# Patient Record
Sex: FEMALE | Race: WHITE
[De-identification: names, ages, dates, MRNs, and addresses within clinical notes are randomized per-mention and may not be internally consistent; named-entity substitution may affect disease eponyms.]

---

## 2019-06-05 ENCOUNTER — HOSPITAL ENCOUNTER (OUTPATIENT)
Dept: HOSPITAL 76 - PC | Age: 79
Discharge: HOME | End: 2019-06-05
Attending: NURSE PRACTITIONER
Payer: MEDICARE

## 2019-06-05 DIAGNOSIS — R63.0: ICD-10-CM

## 2019-06-05 DIAGNOSIS — R53.83: ICD-10-CM

## 2019-06-05 DIAGNOSIS — Z82.0: ICD-10-CM

## 2019-06-05 DIAGNOSIS — Z66: ICD-10-CM

## 2019-06-05 DIAGNOSIS — Z91.81: ICD-10-CM

## 2019-06-05 DIAGNOSIS — H54.7: ICD-10-CM

## 2019-06-05 DIAGNOSIS — Z85.819: ICD-10-CM

## 2019-06-05 DIAGNOSIS — Z91.83: ICD-10-CM

## 2019-06-05 DIAGNOSIS — Z86.73: ICD-10-CM

## 2019-06-05 DIAGNOSIS — F41.9: ICD-10-CM

## 2019-06-05 DIAGNOSIS — R32: ICD-10-CM

## 2019-06-05 DIAGNOSIS — G30.9: ICD-10-CM

## 2019-06-05 DIAGNOSIS — L40.9: ICD-10-CM

## 2019-06-05 DIAGNOSIS — R64: ICD-10-CM

## 2019-06-05 DIAGNOSIS — R53.1: ICD-10-CM

## 2019-06-05 DIAGNOSIS — F02.81: ICD-10-CM

## 2019-06-05 DIAGNOSIS — Z51.5: Primary | ICD-10-CM

## 2019-06-05 DIAGNOSIS — R25.1: ICD-10-CM

## 2019-06-05 DIAGNOSIS — K59.09: ICD-10-CM

## 2019-06-05 DIAGNOSIS — Z85.3: ICD-10-CM

## 2019-06-05 PROCEDURE — 99344 HOME/RES VST NEW MOD MDM 60: CPT

## 2019-06-05 NOTE — CONSULTATION NOTE
Palliative Care Consultation





- Referral


Referring Provider: Swathi CONNER


Time of Visit: 6879-1177


Referral setting: Home


Referral Reason: Alzheimer's Dementia/Cachexia/Goals of Care





- Information Sources


Records reviewed: Previous records reviewed (few available)


History/Review of Systems obtained from: Family (daughter Flora and  

Candido at visit)


Exam limitations: Clinical condition (patient nonverbal)





- History of Present Illness


Brief History of Present Illness: 


Is a 79-year-old woman with advanced Alzheimer's dementia, presenting as a 

FAST7B. Her  Candido, who is a  20 years, has very poor recall and 

short-term memories himself as far as being able to recall exactly patient's 

history, including cancer history, or journey with dementia.  Daughter has had 

her for 5 years, reports she had forgetfulness previous to this, and had 

deteriorated fairly rapidly with the treatment of her throat cancer she recalls.

 She has been nonverbal, but ambulatory.  She has had wandering in the past, she

has not been violent, is unclear if she has had delusions or hallucinations, 

though does  things and  things from the floor frequently.  She 

has been on donepazil and memanda in past, stopped in fall when "ran out" and no

provider. Her current behaviors include wandering frequently through the house, 

frequent falls, she has had no recent UTIs or hospitalizations.  Her most 

pronounced symptom has been weight loss.  This is despite daughter's perception,

patient is eating fairly adequate calories, unclear if its patient's activity 

level or patient's intake that is adding to the decline.  Patient without teeth,

does have a soft diet, she does get ice cream floats, she drinks an Ensure 

daily.  She does need some encouragement, she does chew continously so eating 

takes most of the day. Daughter reports most likely is not getting adequate 

fluids.





Daughter is quite anxious regarding she feels like she is in her last phase.  

Both  and daughter are quite clear focuses to be comfort.  Do not want to

go to the hospital.  And she has been a DNR in the past.  Daughter presents with

caregiver fatigue, she is looking forward to support from her Sister Lexii who 

is coming with her other sister to live with her and help provide ongoing 

caregiving.





Patient dislikes being touched, did manage to do an exam, but unable to examine 

closely for lymphadenopathy, though no swelling, or signs of symptoms of pain.  

She would not allow a breast exam, but could not see any scarring or evidence of

lumpectomy.  Her old records only said history of and was not expanded on.  

Flora reports her other sister was more involved than that, she will be 

available at future visit in early July. She is somewhat hypotensive, with 

elevated heart rate, but is quite anxious having me examine her, but did relax 

through end of visit.








Medical/Surgical History





- Past Medical History


Neuro: Alzhiemer's, Dementia, CVA, Tremors


GI: reports: Chronic constipation


: reports: Incontinence


HEENT: reports: Chronic vision loss


Psych: reports: Anxiety


Musculoskeletal: reports: Fatigue


Derm: reports: Psoriasis


Other Past Medical History: patient with documented Breast cancer old record 

unknown treatment ?; documented "head and neck" cancer; daughter thought 

throat cancer ?;  does not remember; thinks may have received 

radiation at some point





Social History





- Living Situation


Living arrangement: At home


Living Situation: With spouse/s.o. (Patient lived in [place], she was a social 

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  She  weighing, they were  for 20 years.  As

she got worse, she came to live with her daughter Flora in [place].  She has been 

living with her for 5 years, at the time she arrived she was nonverbal.  She 

also had wandering behaviors.), With family (She lives with Flora, who is a 

primary caregiver, Candido does provide some support.  She did have a paid 

caregiver in [place], she has been living here since September with no other 

assistance.  She is hoping to get a [place]  Her

sister is coming the beginning of July, she will assist with caregiving.)





Family History





- Family History


Family History: Mother: , Alzheimer's Disease, Father: , 

CVA/TIA, Sister: , Alzheimer's Disease, Other family: Alive and Well 

(has three daughter)





Medications/Allergies





- Medications


Home Medications: 


                                Ambulatory Orders











 Medication  Instructions  Recorded  Confirmed


 


Multivitamin [Multivitamins] 1 tab PO DAILY 19














- Allergies


Allergies/Adverse Reactions: 


                                    Allergies











Allergy/AdvReac Type Severity Reaction Status Date / Time


 


No Known Drug Allergies Allergy   Verified 19 16:24














Review of Systems





- Constitutional


Constitutional: reports: Fatigue, Poor appetite, Weight loss (documented weight 

5/17 record 115; 83.4 on 5/3 at MD appt)





- Eyes


Eyes: reports: Vision loss (daughter feels grabs at things and misses; unclear 

if can't see them clearly or just her dementia)





- Ears, Nose & Throat


Ears, Nose & Throat: reports: Other (no teeth; lost dentures)





- Respiratory


Respiratory: reports: Other (no respiratory distress observed by family)





- Gastrointestinal


Gastrointestinal: reports: Constipation (bm today), Other (patient appears to 

eat; watched eat lunch;Has not had any choking, tends to just chew continuously.

  Did not refuse food.  She has needed assistance with feeding for the last few 

months.  This is a new decline.)





- Genitourinary


Genitourinary: reports: Incontinence





- Musculoskeletal


Musculoskeletal: reports: Stiffness, Other (Had several falls, but no injury.  

Sometimes it does find her on the floor, unclear if it is balance, impulsivity, 

or weakness as an etiology at this time.)





- Integumentary


Integumentary: reports: Dryness





- Neurological


Neurological: reports: General weakness, Memory problems (FAST7C; No, is able to

 communicate if she does not want something by pushing away.)





- Psychiatric


Psychiatric: reports: Anxiety, Behavior disturbances





- Endocrine


Endocrine: reports: Intolerance to cold (Patient lived for long period of time 

in [place], daughter does feel like patient is cold most of the time.)





- Hematologic/Lymphatic


Hematologic/Lymphatic: denies: Recurrent infections





- All Other Systems


All Other Systems: reports: Other (limited ROS)





Physical Exam





- Vital Signs


Temperature: 96.5 C


Respiratory Rate: 96


Blood Pressure: 92/42





- Physical Exam


General Appearance: positive: Anxious


Eyes Bilateral: positive: Other (right lower eye lid /droopy)


ENT: positive: Other (moist mucous membranes)


Neck: positive: Trachea midline.  negative: Lymphadenopathy (R), Lymphadenopathy

 (L)


Cardiovascular: positive: Tachycardia


Respiratory: positive: Diminished in bases.  negative: Wheezes, Rales, Rhonchi


Abdomen: positive: Non-tender, Soft


Skin: positive: Pallor, Dryness


Extremities: positive: No pedal edema, Other (Patient able to get from sitting 

to standing, she did wander continuously from room to room.  Gait is shuffled, 

steady, does not appear to have balance issues.  Though does have stooped 

posture.  She does have some upper extremity wasting, legs thin but not 

wasting.)


Neurologic/Psychiatric: positive: Mood/affect nml, Disoriented to person, 

Disoriented to place, Disoriented to time, Weakness





Palliative Care





- POLST


Patient has POLST: Yes


POLST Status: DNR, Comfort Measures (completed with  and daughter time of

 visit)


Pain: No pain, Comment (Family report no history of pain, the patient does not 

present with pain behaviors.  On examination did not seem painful or 

distressed.)


Drowsiness/Sedation: Comment (And is sleeping more, takes frequent naps, does 

sleep through the night.  Even though patient quite active when she is up and 

moving, she is sleeping for greater percentage of the day.)


Sleep: Sleeps well


Constipation: Yes, Unmanaged


Feelings of wellbeing/Perceived Quality of Life: Poor, Worsening (daughter's 

perception of patient's quality of life is declining)


Performance Status: 


Patient is ambulatory, and actually has fairly frenetic movement through the 

day.  She is nonverbal, though is able to make eye contact.  Family does believe

 she recognizes who they are.  She does try engage in some social interaction 

while we were sitting there.  They are able to bathe her without any difficulty,

 she has been incontinent of her several years.  What is changed most recently 

as patient is unable to feed herself, and appears to forgotten how to eat and 

swallow does chew continuously.  Would put her at F AST 7B.








- Palliative Care


Discussion: 


Discussion regarding patient's current quality of life, reports patient has 

continued to deteriorate.  Has been present, did not add much to the 

conversation, unclear if he has memory problems as well but is able to 

participate in the conversation.  Both agree patient has been declining fairly 

rapidly, most likely presented and weight loss.  They do perceive she is 

sleeping more.  As far as goals of care, they do want her to be a do not attempt

 resuscitation, she has been in the past.  Her  is her D POA, we reviewed

 the POLST, they do not want hospitalization or further work-up.  It would be 

difficult for patient to participate in any kind of testing including lab 

testing.  After much discussion decision was made for DNA R/comfort measures.  

Patient does not meet hospice criteria at this point in time, though she has had

 weight loss, she has had no infections, no hospitalizations, no acute sequela 

of falls.  Agreed would continue to monitor for the next few weeks, her weight 

loss may be related to other processes not just her dementia, though daughter 

does feel like her time is "coming".  The daughter does exhibit severe anxiety 

regarding this, is very relieved her sister is coming to provide some care and 

support with recent changes and decline. 








Impression and Recommendations





- Palliative Care


Impression: 


This is a 79-year-old woman who has had progressive Alzheimer's presents as a F 

AST 7B, with recent loss of ability to feed herself.  She is cachetic acute 

weight loss of 15 to 20 pounds over the last couple months and 40 pounds total 

over the last couple years. Goals of family are to focus on comfort, avoid 

hospitalization, and transition to hospice when meets criteria.  Palliative care

 to continue to provide support, education regarding disease and trajectory, and

 anticipatory guidance until transition.





Recommendations/Counseling Done: 


1. Dementia with behavioral disturbances.  Patient does present as FASTB. At 

this point in time they are using distraction, redirection, manage patient's 

frequent pacing, intermittent anxiety, and not perceive acute agitation or need 

for intervention.  We will continue to monitor, patient may benefit from low-

dose Seroquel 12.5 mg, but will continue to monitor given patient's ongoing fall

 risk.





2.  Cachexia.  Patient does present with decreased intake, more related to her 

dementia, with slow eating, does not self initiate feeding anymore.  Challenging

 to keep patient hydrated.  Discussed at length current strategies using, ways 

to increase fluid with puddings, pured foods, she does like ice cream.  

Counseling provided given their current goals, eating for pleasure.  Unclear if 

there may be a secondary malignancy process going on, adding to her weight loss.

  Counseling provided regarding weighing benefits and burdens, given goals to 

focus on comfort will not initiate further work-up for definitive diagnosis.





3.  Caregiver burnout.  Patient is cared for by her , but daughter wanted

 his primary caregiver.  Explored options for respite, her sister is coming 

actually in July to be part of the caregiving team, she then hopes to find a 

job.  Recommended Alzheimer's support group and caregiving group.  Call to Whit 

at [facility] with referral, to follow-up with Flora.  Patient may qualify 

for TSS program.





4.  Advanced care planning.  Patient's  Candido is LADY LARSON, supported by 

patient's daughter Flora David.  Patient though presenting as cachectic and severe

 weight loss, at this point does not meet hospice criteria in the context has 

had no recent infections, hospitalizations, and is still ambulatory.  Counseling

 provided regarding hospice and hospice criteria, goals of care currently to 

focus on comfort focused treatment.  POLST completed to reflect this.  

Palliative care will continue to follow until meets hospice criteria.





Time Spent: 


60 minutes with greater than 50% of this done in counseling regarding goals of 

care, advanced care planning, education on trajectory of dementia, community 

resources, and anticipatory guidance.

## 2019-07-08 ENCOUNTER — HOSPITAL ENCOUNTER (OUTPATIENT)
Dept: HOSPITAL 76 - PC | Age: 79
Discharge: HOME | End: 2019-07-08
Attending: NURSE PRACTITIONER
Payer: MEDICARE

## 2019-07-08 DIAGNOSIS — Z85.818: ICD-10-CM

## 2019-07-08 DIAGNOSIS — Z66: ICD-10-CM

## 2019-07-08 DIAGNOSIS — G30.9: ICD-10-CM

## 2019-07-08 DIAGNOSIS — Z51.5: Primary | ICD-10-CM

## 2019-07-08 DIAGNOSIS — R53.83: ICD-10-CM

## 2019-07-08 DIAGNOSIS — Z91.83: ICD-10-CM

## 2019-07-08 DIAGNOSIS — R32: ICD-10-CM

## 2019-07-08 DIAGNOSIS — I69.920: ICD-10-CM

## 2019-07-08 DIAGNOSIS — H54.7: ICD-10-CM

## 2019-07-08 DIAGNOSIS — R15.9: ICD-10-CM

## 2019-07-08 DIAGNOSIS — F02.81: ICD-10-CM

## 2019-07-08 DIAGNOSIS — R63.4: ICD-10-CM

## 2019-07-08 PROCEDURE — 99350 HOME/RES VST EST HIGH MDM 60: CPT

## 2019-07-08 NOTE — CONSULTATION NOTE
Palliative Care Follow Up





- Referral


Referring Provider: Swathi CONNER


Time of Visit: 1397-2091


Referral setting: Home


Referral Reason: Dementia/Goals of Care





- Information Sources


History/Review of Systems obtained from: Family (Daughter Flora primary CG; 

 Candido; Daughter Lexii who has moved in ; dasilvestre Kumar visiting from 

[place])


Exam limitations: Clinical condition (patient with advanced dementia)





- History of Present Illness Update


Brief HPI Update: 


This is a 79-year-old woman with advanced Alzheimer's, presenting as  FAST 7B. 

She lives with her daughter Flora, and patient's  Candido lives with them 

as well, they have been  to her 20 years.  He himself has short-term 

memory issues, but does help with her caregiving.  Daughter has cared for 

patient over 5 years, her daughter Stacy who is with her during her treatment, was

able to fill in a little bit more about her throat cancer.  


They did find her throat cancer early, she was able to have resection, and it 

was followed with chemoradiation with curative intent.  During this time she did

have most likely a stroke towards end of treatment, she was placed in the 

nursing home with a PEG tube, had presented with aphasia, and had deteriorated 

pretty much since that time as far as her forgetfulness.  She is nonverbal, but 

ambulatory.  She sleeps a large portion of the time, but is doing better with 

eating and drinking.  She has no choking, does need encouragement.  She is 

incontinent of bowel and bladder.  She does need oversight as she does wander a

nd is at high risk for falls, though she has not had any recently.  Patient does

not have any teeth, she is on a soft diet, and drinks Ensure daily.  She does 

have some perseverative chewing, so eating takes a significant amount of time.  

They are working on getting her adequate fluids as well.  She did have a 

significant weight loss over several months of about 30 pounds is a good 

estimate, currently though compared to last visit she does look like she is 

filled out her cheeks, unable to get accurate weight.  


She is quite resistant to removing clothing, but was able to do an exam with a 

blood pressure.  Patient does not appear in any kind of distress, no pain, 

reports she is slightly dizzy when she first gets up.  They are pleased that she

has not had further decline since our visit. She does need assistance with most 

ADLs, they do need to assist with feeding but patient can do some with finger 

foods and straws.





Flora has been looking forward to having her Sister Lexii, who just moved in to 

help with caregiving.  [place] 

been very little respite.  They are working with senior services, to explore 

what services they do qualify for.Palliative care providing support and 

oversight until patient transitions to hospice.  Goals are for maximizing 

quality of life and keeping her comfortable.








Social History





- Living Situation


Living arrangement: At home


Living Situation: With spouse/s.o., With family


Support System: 


Patient's daughter Flora his primary caregiver, Has been provide support as 

well, patient does sleep downstairs with .  Daughter Lexii has come to 

help with caregiving there are financial stressors, Flora is hoping to find 

employment soon








Medications/Allergies





- Medications


Home Medications: 


                                Ambulatory Orders











 Medication  Instructions  Recorded  Confirmed


 


Multivitamin [Multivitamins] 1 tab PO DAILY 06/05/19 06/05/19














- Allergies


Allergies/Adverse Reactions: 


                                    Allergies











Allergy/AdvReac Type Severity Reaction Status Date / Time


 


No Known Drug Allergies Allergy   Verified 06/05/19 16:24














Review of Systems





- Constitutional


Constitutional: reports: Fatigue, Other (unable to weigh; cachetic in appearance

 but does not appear worse today; maybe some filling in of cheeks).  denies: 

Fever





- Eyes


Eyes: reports: Vision loss





- Ears, Nose & Throat


Ears, Nose & Throat: reports: Other (edentulous)





- Cardiovascular


Cardiovascular: reports: Lightheadedness.  denies: Edema





- Respiratory


Respiratory: denies: SOB at rest, SOB with exertion





- Gastrointestinal


Gastrointestinal: reports: Good appetite.  denies: Constipation





- Genitourinary


Genitourinary: reports: Incontinence





- Musculoskeletal


Musculoskeletal: reports: Stiffness, Muscle weakness





- Integumentary


Integumentary: reports: Dryness, Other (has basal cell tumor on left forearm; 

has been there long term;)





- Neurological


Neurological: reports: General weakness, Memory problems, Other (occasional wor

ds; mostly nonverbal)





- Psychiatric


Psychiatric: reports: Other (somes resistance to changing clothes; bath her 2x a

 week)





- Hematologic/Lymphatic


Hematologic/Lymphatic: denies: Recurrent infections





- All Other Systems


All Other Systems: reports: Other (limited ROS with dementia)





Physical Exam





- Vital Signs


Temperature: 97.3 C


Pulse Rate: 72


Respiratory Rate: 18


O2 Saturation: 93 (ra @ rest)


Blood Pressure: 92/52





- Physical Exam


General Appearance: positive: No acute distress, Anxious


Eyes Bilateral: positive: Other (right lower eyelid  at bottom; no s/s

 infection dryness or drainage)


ENT: negative: Pharyngeal erythema, Dry mucous membranes


Neck: positive: Trachea midline.  negative: Lymphadenopathy (R), Lymphadenopathy

 (L)


Cardiovascular: positive: Regular rate & rhythm


Respiratory: positive: Diminished in bases.  negative: Wheezes, Rales, Rhonchi


Abdomen: positive: Non-tender, Soft


Skin: positive: Pallor, Other (3 cm raised basal lesion; no bleeding soft and 

not dry; no s/s infection; if keep covered patient will leave alone.)


Extremities: positive: No pedal edema


Neurologic/Psychiatric: positive: Mood/affect nml, Disoriented to person, 

Disoriented to place, Disoriented to time, Weakness, Flat affect





Palliative Care





- POLST


Patient has POLST: Yes


POLST Status: DNR, Comfort Measures


Pain: No pain


Constipation: No





- Palliative Care


Discussion: 


Met with 3 daughters and , reviewed and counseled regarding palliative 

care versus hospice care, hospice criteria, and need for patient to have 

"terminal diagnosis".  The dementia is a progressive disease, can have a long 

trajectory.  Patient has improved as far as her intake, is more bright and 

interactive, and appears better than last visit.  Goals are quite clear to focus

 on comfort, aware of trajectory of disease, Lexii who is new player on the 

scene, recommended [email or web address], they do have caregiving classes available 

for free.  They are meeting with senior services tomorrow, and going to the 

support group.  Answered questions regarding what is and is not available at 

this point in time for support.  They definitely could use some respite, 

hopefully will qualify for a few hours a month.Daughter Flora hopes to seek 

employment soon, with support of Lexii will be able to manage caregiving better.








Impression and Recommendations





- Palliative Care


Impression: 


This is a 79-year-old woman who has progressive Alzheimer's presenting as a 

FAST7B, patient does have cachexia, and recent acute weight loss but does appear

 better than last visit.  Family goals are to focus on comfort, avoiding 

hospitalization, and transition to hospice when meets criteria.  Palliative care

 to continue provide support, education regarding disease and trajectory, and 

anticipatory guidance until transition.





Recommendations/Counseling Done: 


1. Dementia with behavioral disturbances.  Patient does present as a FAST7B.  

Patient is at this point in time able to be managed with redirection, 

distraction, and support.  At this point in time no indication for medications.





2.Cachexia.  Patient has had decreased intake, they are working on keeping her 

hydrated and increased calories.  Are using multiple strategies, counseling 

again provided regarding eating for pleasure.  They are looking at maintaining 

her weight as best as possible, will send Rx to senior services for Ensure.  Did

 follow-up with daughter LADY, does not appear history of malignancy is playing a 

part in her current picture, though this cannot be confirmed.





3.  Caregiver burnout.  Patient is cared for by her  and daughter.  Her 

sister is here, so this should help as far as increasing the caregiving team.  

Did recommend again Alzheimer's support group and follow-up with senior services

 regarding qualifying for to [facility] program.





4. Incontinence.  Counseling provided regarding the use of Cavilon barrier cream

 to prevent skin irritation, management of lefty-care, they are bathing or twice 

a week.  She does have dry skin. Patient has not had any UTIs, education 

regarding identifying symptoms.





5.  Advanced care planning patient does have POLST with DNA R and comfort 

measures.  Patient's  Candido is D NEO, will need to be supported by 

patient's daughter Flora Hernandez secondary to his memory problems.  Patient has 

improved some from last visit, does not meet hospice criteria in the context no 

recent infections, hospitalizations, is ambulatory, and does not present with 

further decline today.  Palliative care will continue to follow until meets 

hospice criteria.  Did introduce other members of the team  if 

needed for further resources support and  for support both for patient 

and family.  Declined at this time.








Time Spent: 


60 minutes is greater than 50% of this done in family meeting in counseling 

regarding goals of care, anticipatory guidance, disease education and trajectory

 as well as coordination of care regarding follow-up with senior center.

## 2019-08-29 ENCOUNTER — HOSPITAL ENCOUNTER (OUTPATIENT)
Dept: HOSPITAL 76 - PC | Age: 79
Discharge: HOME | End: 2019-08-29
Attending: NURSE PRACTITIONER
Payer: MEDICARE

## 2019-08-29 DIAGNOSIS — F41.9: ICD-10-CM

## 2019-08-29 DIAGNOSIS — F02.81: ICD-10-CM

## 2019-08-29 DIAGNOSIS — Z51.5: Primary | ICD-10-CM

## 2019-08-29 DIAGNOSIS — Z91.83: ICD-10-CM

## 2019-08-29 DIAGNOSIS — R64: ICD-10-CM

## 2019-08-29 DIAGNOSIS — Z66: ICD-10-CM

## 2019-08-29 DIAGNOSIS — R32: ICD-10-CM

## 2019-08-29 DIAGNOSIS — G30.9: ICD-10-CM

## 2019-08-29 PROCEDURE — 99348 HOME/RES VST EST LOW MDM 30: CPT

## 2019-08-29 NOTE — CONSULTATION NOTE
Palliative Care Follow Up





- Referral


Referring Provider: Swathi CONNER


Time of Visit: 4013-6760


Referral setting: Home


Referral Reason: Advanced Dementia





- Information Sources


Records reviewed: Previous records reviewed


History/Review of Systems obtained from: Family (Flora @ visit)


Exam limitations: Clinical condition (Patient none verbal with advanced 

dementia)





- History of Present Illness Update


Brief HPI Update: 


This is a 79-year-old woman with advanced Alzheimer's, presenting as a fast 7B. 

She lives with her daughter Flora, and patient's  Candido lives with them 

as well, they have been  for over 20 years.  He has some short-term 

memory issues, but helps with her caregiving, her daughter Lexii who is just 

moved and is to help with caregiving him they have found this very helpful.  

Patient does have incontinence of bowel and bladder, needs oversight as she does

wander, is high risk for falls and did have several falls on Sunday with unknown

etiology.  Does suspect may be related to her left great toe, has thick fungal 

nail, that does appear to be irritated and causing as source of discomfort.





Patient does appear better hydrated, she has plumped out in her cheeks, and her 

extremities.  Flora who is present, reports Lexii has been working on a regular 

basis to get calories in her.  It does appear she is continue to improve.  They 

will get it weight when they go to the providers office this afternoon.  Patient

was cooperative with visit, was able to do vital signs, she still needs 

assistance with most ADLs, and assistance with feeding the patient can do some 

finger foods and straws.  They do feel like things have plateaued, but are 

concerned about addressing her toe.





Flora appears more rested, is continue to look for work well lower is providing 

increased support.  They have not followed up with senior services, are 

agreeable to have  to reach out to them, and will make a referral to

Larissa Burnette who does do home visits for dementia training.  Palliative care

providing support and oversight until patient transitions to hospice.  Goals 

remain for maximizing quality of life and keeping her comfortable








Social History





- Living Situation


Living arrangement: At home


Living Situation: With spouse/s.o., With family


Support System: 


Daughter Lexii, who is primary caregiver, is recently taken up this role.  Flora

has cared for her for several years, and continues to provide support.  They 

feel currently things have improved with increased caregiver help in home.








Medications/Allergies





- Medications


Home Medications: 


                                Ambulatory Orders











 Medication  Instructions  Recorded  Confirmed


 


Multivitamin [Multivitamins] 1 tab PO DAILY 06/05/19 06/05/19














- Allergies


Allergies/Adverse Reactions: 


                                    Allergies











Allergy/AdvReac Type Severity Reaction Status Date / Time


 


No Known Drug Allergies Allergy   Verified 06/05/19 16:24














Review of Systems





- Constitutional


Constitutional: reports: Weight gain (does appear to have gained weight; will 

get weight at provider office)





- Ears, Nose & Throat


Ears, Nose & Throat: reports: Other (edentulous)





- Gastrointestinal


Gastrointestinal: reports: Good appetite.  denies: Constipation





- Genitourinary


Genitourinary: reports: Incontinence





- Musculoskeletal


Musculoskeletal: reports: Other (ambulates/wanders through the house frequently)





- Integumentary


Integumentary: reports: Lesions (left arm assumed basal ca; they keep it covered

 does not appear to be worsening), Nail changes (right great toe)





- Neurological


Neurological: reports: General weakness, Memory problems





- Psychiatric


Psychiatric: reports: Anxiety, Behavior disturbances (wandering; some care 

resistance)





- Hematologic/Lymphatic


Hematologic/Lymphatic: denies: Recurrent infections





- All Other Systems


All Other Systems: reports: Other (limited ROS with dementia)





Physical Exam





- Vital Signs


Temperature: 96.8 C


Pulse Rate: 83


Respiratory Rate: 18


O2 Saturation: 98 (ra @ rest)


Blood Pressure: 102/64





- Physical Exam


General Appearance: positive: No acute distress, Alert, Anxious


Eyes Bilateral: positive: Normal inspection


ENT: positive: No signs of dehydration, Other (moist mucous membranes)


Neck: negative: Lymphadenopathy (R), Lymphadenopathy (L)


Cardiovascular: positive: Regular rate & rhythm


Respiratory: positive: No respiratory distress, Diminished in bases.  negative: 

Wheezes, Rales, Rhonchi


Abdomen: positive: Non-tender, Soft, Nml bowel sounds.  negative: Guarding


Skin: positive: Dryness, Pruritis, Other (infected right thickened toe nail)


Extremities: positive: No pedal edema


Neurologic/Psychiatric: positive: Disoriented to person, Disoriented to place, 

Disoriented to time, Unintelligible speech (moaning noises), Flat affect





Palliative Care





- POLST


Patient has POLST: Yes


POLST Status: DNR, Comfort Measures


Pain: Comment (unclear if toe painful or not)


Sleep: Sleeps well


Feelings of wellbeing/Perceived Quality of Life: Fair, Improved


Performance Status: 


Patient does need to be supervised somewhat in her wandering, she does have a 

shuffled gait though does appear somewhat stronger and balance improved.  She 

does need assist with all ADLs, and assistance with feeding.She still spends a 

significant amount time sleeping, but is awake and alert and interactive during 

visit








- Palliative Care


Discussion: 


Flora feels like things are improved with support from daughter Lexii helping as

 well.  There is still interested in following up on support available in 

community, and possibly long-term planning if needed.  Flora is relieved that 

she is at least at a plateau and not worsening, though is very concerned about 

the toe as far as next steps.  Patient care needs are getting met, patient has 

improved from initial visit, agreed palliative care support available on on-call

 basis.








Impression and Recommendations





- Palliative Care


Impression: 


Is an unfortunate 79-year-old woman has progressive Alzheimer's presenting as a 

fast 7B, patient has had improved intake, with no further acute weight loss.  

Patient does appear more hydrated and with increased strength, balance remains 

problematic.  Currently acute issue is her right toe, seeing PCP later this 

afternoon.  Family goals are to continue to focus on comfort, avoiding 

hospitalization, and transition to hospice when meets criteria.  Daughter Flora 

open for her senior services support, and dementia one-on-one training.  

Palliative care to continue provide support, education again regarding disease 

and trajectory and anticipatory guidance until transition.





Recommendations/Counseling Done: 


1. Dementia with behavioral disturbances.  Patient does present as a fast 7B, 

she at this point in time is able to be managed with redirection, distraction 

and support.  No neuropsychiatric behaviors that.  Need to be addressed, but 

will make referral to dementia specialists for family and caregiving support.  

We did discuss the patient does have to have surgical procedure, or further 

follow-up with podiatrist, can consider Lorazepam 2 mg per mill, with small 

increment of dosing of 0.125 mg to manage anxiety prior to appointment.





2.  Cachexia.  Patient does appear hydrated, with some improvement in her 

weight.  She will get a weighted PCP today.  They are using multiple strategies 

and doing well with continual frequent feedings.  They are looking at ma

intaining her weight as best as possible, and will continue to fourth her 

effort.





3.  Caregiver burnout.  Daughter does appear better supported with her sister 

currently present here.  They have not followed through on senior referral or 

Alzheimer's support group, will make a official referral for contact.





4.  Advanced care planning.  Patient does have a POLST with [employer] [facility] and comfort 

measures.  Patient's  Candido is the D POA, will need to be supported by 

patient's daughter Flora Hernandez secondary to his memory problems.  Patient 

continues to improve, does not currently meet hospice criteria but will continue

 to monitor and contact palliative care for decline.





Time Spent: 


30 minutes is greater than 50% of this done in counseling regarding goals of 

care, anticipatory guidance, coordination of care with community services.